# Patient Record
Sex: MALE | Race: OTHER | NOT HISPANIC OR LATINO | URBAN - METROPOLITAN AREA
[De-identification: names, ages, dates, MRNs, and addresses within clinical notes are randomized per-mention and may not be internally consistent; named-entity substitution may affect disease eponyms.]

---

## 2024-01-02 ENCOUNTER — EMERGENCY (EMERGENCY)
Facility: HOSPITAL | Age: 29
LOS: 1 days | Discharge: HOME | End: 2024-01-02
Admitting: EMERGENCY MEDICINE
Payer: MEDICAID

## 2024-01-02 VITALS
OXYGEN SATURATION: 98 % | RESPIRATION RATE: 17 BRPM | WEIGHT: 197.98 LBS | DIASTOLIC BLOOD PRESSURE: 65 MMHG | HEIGHT: 69 IN | TEMPERATURE: 97 F | HEART RATE: 80 BPM | SYSTOLIC BLOOD PRESSURE: 139 MMHG

## 2024-01-02 PROCEDURE — 99282 EMERGENCY DEPT VISIT SF MDM: CPT

## 2024-01-02 PROCEDURE — 99283 EMERGENCY DEPT VISIT LOW MDM: CPT

## 2024-01-02 NOTE — ED PROVIDER NOTE - CLINICAL SUMMARY MEDICAL DECISION MAKING FREE TEXT BOX
Pt afebrile and hemodynamically stable. NO acute complaint today. Requesting list of free or low cost dental providers which was provided. Return precautions given.

## 2024-01-02 NOTE — ED PROVIDER NOTE - OBJECTIVE STATEMENT
27yo male with no reported pmhx presents with 3mo of left upper dental pain. Denies acute change in symptoms today. Requesting list of free or low cost dental clinics. He denies fever, chills, ear pain, throat pain, difficulty swallowing. 29yo male with no reported pmhx presents with 3mo of left upper dental pain. Denies acute change in symptoms today. Requesting list of free or low cost dental clinics. He denies fever, chills, ear pain, throat pain, difficulty swallowing.

## 2024-01-02 NOTE — ED PROVIDER NOTE - NS ED ROS FT
Constitutional: No fever. No chills.  Eyes: No redness. No discharge. No vision change.   ENT: No sore throat. No ear pain. +dental pain.  Cardiovascular: No chest pain. No leg swelling.  Respiratory: No cough. No shortness of breath.  GI: No abdominal pain. No vomiting. No diarrhea.   MSK: No joint pain. No back pain.   Skin: No rash. No abrasions.   Neuro: No numbness. No weakness.   Psych: No known mental health issues.

## 2024-01-02 NOTE — ED ADULT NURSE NOTE - OBJECTIVE STATEMENT
c/o dental pain x 3 months. Pt reports he went to a dental clinic, however he was unable to be seen due to lack of insurance. Denies fevers.  [id number] used. c/o dental pain x 3 months. Pt reports he went to a dental clinic, however he was unable to be seen due to lack of insurance. Denies fevers.  [id number] used.

## 2024-01-02 NOTE — ED PROVIDER NOTE - PATIENT PORTAL LINK FT
You can access the FollowMyHealth Patient Portal offered by [facility] by registering at the following website: [email or web address]. By joining [facility]’s FollowMyHealth portal, you will also be able to view your health information using other applications (apps) compatible with our system. You can access the FollowMyHealth Patient Portal offered by [facility] by registering at the following website: [email or web address]. By joining [facility]’s FollowMyHealth portal, you will also be able to view your health information using other applications (apps) compatible with our system.

## 2024-01-02 NOTE — ED ADULT NURSE NOTE - NSFALLUNIVINTERV_ED_ALL_ED
Bed/Stretcher in lowest position, wheels locked, appropriate side rails in place/Call bell, personal items and telephone in reach/Instruct patient to call for assistance before getting out of bed/chair/stretcher/Non-slip footwear applied when patient is off stretcher/[place] to call system/Physically safe environment - no spills, clutter or unnecessary equipment/Purposeful proactive rounding/Room/bathroom lighting operational, light cord in reach Bed/Stretcher in lowest position, wheels locked, appropriate side rails in place/Call bell, personal items and telephone in reach/Instruct patient to call for assistance before getting out of bed/chair/stretcher/Non-slip footwear applied when patient is off stretcher/[place] to call system/Physically safe environment - no spills, clutter or unnecessary equipment/Purposeful proactive rounding/Room/bathroom lighting operational, light cord in reach

## 2024-01-02 NOTE — ED PROVIDER NOTE - NSFOLLOWUPINSTRUCTIONS_ED_ALL_ED_FT
[facility] offers an emergency dental clinic.   [facility] is located at [address] [place] [address] (corner of [facility]) in [place].  For Emergency Services/Urgent Care, please call [phone]. [facility] offers an emergency dental clinic.   [facility] is located at [address] [place] [address] (corner of [facility]) in [place].  For Emergency Services/Urgent Care, please call [phone].

## 2024-01-05 DIAGNOSIS — K08.89 OTHER SPECIFIED DISORDERS OF TEETH AND SUPPORTING STRUCTURES: ICD-10-CM

## 2024-01-05 DIAGNOSIS — K02.9 DENTAL CARIES, UNSPECIFIED: ICD-10-CM
